# Patient Record
Sex: FEMALE | Race: BLACK OR AFRICAN AMERICAN | URBAN - METROPOLITAN AREA
[De-identification: names, ages, dates, MRNs, and addresses within clinical notes are randomized per-mention and may not be internally consistent; named-entity substitution may affect disease eponyms.]

---

## 2017-09-27 ENCOUNTER — HOSPITAL ENCOUNTER (OUTPATIENT)
Dept: LAB | Age: 27
Discharge: HOME | End: 2017-09-27

## 2017-09-27 LAB — RUBV IGG SER-IMP: NORMAL

## 2017-09-27 PROCEDURE — 86762 RUBELLA ANTIBODY: CPT | Performed by: EMERGENCY MEDICINE

## 2017-09-27 PROCEDURE — 86706 HEP B SURFACE ANTIBODY: CPT | Performed by: EMERGENCY MEDICINE

## 2017-09-27 PROCEDURE — 86787 VARICELLA-ZOSTER ANTIBODY: CPT | Performed by: EMERGENCY MEDICINE

## 2017-09-27 PROCEDURE — 86735 MUMPS ANTIBODY: CPT | Performed by: EMERGENCY MEDICINE

## 2017-09-27 PROCEDURE — 86765 RUBEOLA ANTIBODY: CPT | Performed by: EMERGENCY MEDICINE

## 2017-09-28 LAB
HBV SURFACE AB SER QL IA: POSITIVE
HBV SURFACE AB SERPL IA-ACNC: 13.47 MIU/ML
HEP BS AB COMMENT,HBSAC: NORMAL
MEV IGG SER IA-ACNC: 60.9 AU/ML
MUV IGG SER IA-ACNC: 225 AU/ML
VZV IGG SER IA-ACNC: 1778 INDEX

## 2021-08-25 ENCOUNTER — PATIENT OUTREACH (OUTPATIENT)
Dept: OTHER | Age: 31
End: 2021-08-25

## 2021-08-25 NOTE — PROGRESS NOTES
Patient contacted regarding COVID-19 positive test results. Discussed COVID-19 related testing which was available at this time. Test results were positive. Patient informed of results, if available? yes. Ambulatory Care Manager contacted the patient by telephone to perform post discharge assessment. Call within 2 business days of discharge: Yes Verified name and  with patient as identifiers. Provided introduction to self, and explanation of the CTN/ACM role, and reason for call due to risk factors for infection and/or exposure to COVID-19. Patient states she has a family member who has covid and was tested due to her exposure. She is asymptomatic, less of an appetite right now. She is quarantined for 10 days. Provided phone number to associate services. She states she plans to contact her manager, as well. Symptoms reviewed with patient who verbalized the following symptoms: no worsening symptoms and no symptoms       Due to no new or worsening symptoms encounter was not routed to provider for escalation. Discussed follow-up appointments. If no appointment was previously scheduled, appointment scheduling offered:  Yes. Patient using [facility] for PCP needs. Offered her assistance with finding a PCP, declined. States she is going to wait for new insurance. [facility] follow up appointment(s): none  Non-[facility] follow up appointment(s):  for medical clearance to return to work. Interventions to address risk factors: No symptoms. Advance Care Planning:   Does patient have an Advance Directive: not on file; education provided. Educated patient about risk for severe COVID-19 due to risk factors according to CDC guidelines. Discussed COVID vaccination status: no, has not been vaccinated. Education provided on COVID-19 vaccination as appropriate. Discussed exposure protocols and quarantine with CDC Guidelines.  Patient was given an opportunity to verbalize any questions and concerns and agrees to contact ACM or health care provider for questions related to their healthcare. Reviewed and educated patient on any new and changed medications related to discharge diagnosis     ACM provided contact information. Plan for follow-up call in 5-7 days based on severity of symptoms and risk factors. Call on 8/31.

## 2021-08-31 ENCOUNTER — PATIENT OUTREACH (OUTPATIENT)
Dept: OTHER | Age: 31
End: 2021-08-31

## 2021-08-31 NOTE — PROGRESS NOTES
Patient resolved from Transition of Care episode on 8/31/2021  D              Patient currently reports that the following symptoms have improved:  nausea. No further outreach scheduled with this CTN/ACM/LPN/HC. Episode of Care resolved. Patient has this CTN/ACM/LPN/HC contact information if future needs arise.